# Patient Record
Sex: MALE | URBAN - METROPOLITAN AREA
[De-identification: names, ages, dates, MRNs, and addresses within clinical notes are randomized per-mention and may not be internally consistent; named-entity substitution may affect disease eponyms.]

---

## 2023-05-30 ENCOUNTER — OFFICE VISIT (OUTPATIENT)
Facility: LOCATION | Age: 83
End: 2023-05-30
Payer: MEDICARE

## 2023-05-30 DIAGNOSIS — H40.013 OPEN ANGLE WITH BORDERLINE FINDINGS, LOW RISK, BILATERAL: Primary | ICD-10-CM

## 2023-05-30 DIAGNOSIS — H02.413 MECHANICAL PTOSIS OF BILATERAL EYELIDS: ICD-10-CM

## 2023-05-30 DIAGNOSIS — E11.9 TYPE 2 DIABETES MELLITUS WITHOUT COMPLICATIONS: ICD-10-CM

## 2023-05-30 DIAGNOSIS — H04.123 DRY EYE SYNDROME OF BILATERAL LACRIMAL GLANDS: ICD-10-CM

## 2023-05-30 DIAGNOSIS — H25.13 AGE-RELATED NUCLEAR CATARACT, BILATERAL: ICD-10-CM

## 2023-05-30 PROCEDURE — 92020 GONIOSCOPY: CPT | Performed by: OPHTHALMOLOGY

## 2023-05-30 PROCEDURE — 99214 OFFICE O/P EST MOD 30 MIN: CPT | Performed by: OPHTHALMOLOGY

## 2023-05-30 PROCEDURE — 92133 CPTRZD OPH DX IMG PST SGM ON: CPT | Performed by: OPHTHALMOLOGY

## 2023-05-30 ASSESSMENT — INTRAOCULAR PRESSURE
OS: 18
OD: 20

## 2023-05-30 NOTE — IMPRESSION/PLAN
Impression: Examination revealed dry eye syndrome secondary to tear deficiencies. Trace SPK  Plan: Recommended patient restart ATs QID OU.

## 2023-05-30 NOTE — IMPRESSION/PLAN
Impression: Patient has type II diabetes with no complications. Last A1c: 7.0 6/2021 Plan: Patient advised to continue care with primary care physician. Discussed with patient the importance of controlling Blood Sugar and A1c levels.

## 2023-05-30 NOTE — IMPRESSION/PLAN
Impression: Examination revealed cataracts. 2+ NS OU Plan: Will revisit once patient visually bothered by cataracts and motivated to proceed with cataract surgery.

## 2023-05-30 NOTE — IMPRESSION/PLAN
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 Patient not bothered by his vision at this time. Plan: Monitor.

## 2023-08-02 NOTE — IMPRESSION/PLAN
Impression: 18 month f/u with OCT and DFE. Patient late by 12 months. POHx: GS OU, UL ptosis L>R, Cataract OU, MARIAH, (-) Ocular/head trauma, surgeries, lasers. FOHx: Negative to glaucoma. PMHx: DMII, HCL, HTN. Eye meds: ATs TID OU. TMAX: unknown. Target IOP: < 21 OU. Plan: Testing:
OCT/ONH 5/2023: OD thin N>T, OS bdl thin SN/IN. OUno definite progression. HVF 24-2 7/2021: OU reliable, scattered changes. Baseline. Pachy: 547/549. Gonio 05/2023: SS 2+ 360 OU Today:
IOP acceptable OU. OCT ONH performed and reviewed. Discussed with patient testing and dilated examination today are stable. Plan: No treatment indicated at this time. RTC in 1 year with DFE OU and gonio. Comment: Recommend derma nail over the counter. Detail Level: Simple Render Risk Assessment In Note?: no